# Patient Record
Sex: FEMALE | Race: BLACK OR AFRICAN AMERICAN
[De-identification: names, ages, dates, MRNs, and addresses within clinical notes are randomized per-mention and may not be internally consistent; named-entity substitution may affect disease eponyms.]

---

## 2020-03-17 ENCOUNTER — HOSPITAL ENCOUNTER (EMERGENCY)
Dept: HOSPITAL 72 - EMR | Age: 70
Discharge: HOME | End: 2020-03-17
Payer: MEDICARE

## 2020-03-17 VITALS — DIASTOLIC BLOOD PRESSURE: 102 MMHG | SYSTOLIC BLOOD PRESSURE: 151 MMHG

## 2020-03-17 VITALS — WEIGHT: 165 LBS | HEIGHT: 66 IN | BODY MASS INDEX: 26.52 KG/M2

## 2020-03-17 DIAGNOSIS — R52: ICD-10-CM

## 2020-03-17 DIAGNOSIS — Z88.6: ICD-10-CM

## 2020-03-17 DIAGNOSIS — Z95.0: ICD-10-CM

## 2020-03-17 DIAGNOSIS — R30.0: Primary | ICD-10-CM

## 2020-03-17 DIAGNOSIS — Z86.73: ICD-10-CM

## 2020-03-17 DIAGNOSIS — I10: ICD-10-CM

## 2020-03-17 LAB
APPEARANCE UR: CLEAR
APTT PPP: (no result) S
GLUCOSE UR STRIP-MCNC: NEGATIVE MG/DL
KETONES UR QL STRIP: NEGATIVE
LEUKOCYTE ESTERASE UR QL STRIP: (no result)
NITRITE UR QL STRIP: NEGATIVE
PH UR STRIP: 7 [PH] (ref 4.5–8)
PROT UR QL STRIP: NEGATIVE
SP GR UR STRIP: 1.01 (ref 1–1.03)
UROBILINOGEN UR-MCNC: NORMAL MG/DL (ref 0–1)

## 2020-03-17 PROCEDURE — 99283 EMERGENCY DEPT VISIT LOW MDM: CPT

## 2020-03-17 PROCEDURE — 96372 THER/PROPH/DIAG INJ SC/IM: CPT

## 2020-03-17 PROCEDURE — 81003 URINALYSIS AUTO W/O SCOPE: CPT

## 2020-03-17 NOTE — NUR
ER DISCHARGE NOTE: Patient is cleared to be discharged per ERMD, pt is aox4, on 
room air, with stable vital signs as documented. pt was given dc and 
prescription instructions and was able to verbalize understanding, pt id band 
removed. pt is able to ambulate with steady gait. pt took all belongings.

## 2020-03-17 NOTE — EMERGENCY ROOM REPORT
History of Present Illness


General


Chief Complaint:  Female Urogenital Problems





Present Illness


HPI


69-year-old female presents to the emergency department complaining of 8 out of 

10 severity dysuria x2 weeks in addition to progressive diffuse body aches that 

are primarily in the joints.  Patient reports that she just recently finished a 

course of antibiotics for UTI symptoms.  Patient states she continues to have 

dysuria despite finishing course of antibiotics.  Patient denies fevers or 

chills.  She reports significant past medical history of high blood pressure 

and pacemaker placement.  Patient denies trauma or fall.  She denies any other 

symptoms at this time and does not have any aggravating or relieving factors.  

Patient reports she is only experiencing pain with urination and denies having 

urinary frequency, urgency, abdominal pain/tenderness, genital lesions or 

rashes. She denies itching.


COVID-19 risk:Travel to affect:  No


Allergies:  


Coded Allergies:  


     HYDROMORPHONE (Verified  Adverse Reaction, Mild, ITCHING, 8/9/09)





Patient History


Past Medical History:  see triage record


Past Surgical History:  none


Pertinent Family History:  none


Pregnant Now:  No


Reviewed Nursing Documentation:  PMH: Agreed; PSxH: Agreed





Nursing Documentation-PMH


Past Medical History:  No History, Except For


Hx Cardiac Problems:  Yes


Hx Hypertension:  Yes


Hx Pacemaker:  Yes


Hx Asthma:  No


Hx COPD:  No


Hx Diabetes:  No


Hx Cancer:  No


Hx Gastrointestinal Problems:  No


Hx Dialysis:  No


History Of Psychiatric Problem:  No


Hx Neurological Problems:  No


Hx Cerebrovascular Accident:  Yes


Hx Seizures:  No





Review of Systems


All Other Systems:  negative except mentioned in HPI





Physical Exam





Vital Signs








  Date Time  Temp Pulse Resp B/P (MAP) Pulse Ox O2 Delivery O2 Flow Rate FiO2


 


3/17/20 17:17 98.8 83 16 154/108 (123) 96 Room Air  








Sp02 EP Interpretation:  reviewed, normal


General Appearance:  no apparent distress, alert, GCS 15, non-toxic


Head:  normocephalic, atraumatic


Eyes:  bilateral eye normal inspection, bilateral eye PERRL


ENT:  hearing grossly normal, normal voice


Neck:  full range of motion


Respiratory:  lungs clear, normal breath sounds, speaking full sentences


Cardiovascular #1:  regular rate, rhythm


Gastrointestinal:  normal bowel sounds, non tender, soft, non-distended, no 

guarding


Genitourinary:  normal inspection, no CVA tenderness


Musculoskeletal:  normal range of motion, gait/station normal, non-tender


Neurologic:  alert, motor strength/tone normal, oriented x3, sensory intact, 

responsive, speech normal


Psychiatric:  judgement/insight normal


Skin:  no rash, normal color, normal inspection


Lymphatic:  no adenopathy





Medical Decision Making


PA Attestation


Dr. Morillo is my supervising Physician whom patient management has been 

discussed with.


Diagnostic Impression:  


 Primary Impression:  


 Dysuria


 Additional Impression:  


 Body aches


ER Course


69-year-old female presents to the emergency department complaining of 8 out of 

10 severity dysuria x2 weeks in addition to progressive diffuse body aches that 

are primarily in the joints.  Patient reports that she just recently finished a 

course of antibiotics for UTI symptoms.  Patient states she continues to have 

dysuria despite finishing course of antibiotics.  Patient denies fevers or 

chills.  She reports significant past medical history of high blood pressure 

and pacemaker placement.  Patient denies trauma or fall.  She denies any other 

symptoms at this time and does not have any aggravating or relieving factors.  

Patient reports she is only experiencing pain with urination and denies having 

urinary frequency, urgency, abdominal pain/tenderness, genital lesions or 

rashes. She denies itching. 





Ddx considered but are not limited to UTi , Pyelo, STI, Stone, Cystitis


Vital signs: are WNL, pt. is afebrile 


H&PE are most consistent with UTI 


ORDERS:


- UA labs are attached  --most indicative of contamination: presence of equal 

amounts of  bacteria and  squamous cells, no elevation in inflammatory markers, 

nitrite negative.


ED INTERVENTIONS:  Pyridium PO and  Toradol IM





-I do not identify an emergent condition at this time. With current presentation

,  pt. is stable for close outpatient follow up and conservative treatment.  D/

w pt. to return promptly to ED with worsening or new symptoms.- Pt. verbalizes' 

understanding and agreement with proposed treatment plan.





DISCHARGE: At this time pt. is stable for d/c to home. Will provide printed 

patient care instructions, and any necessary prescriptions. Care plan and 

follow up instructions have been discussed with the patient prior to discharge.





Labs








Test


  3/17/20


17:33


 


Urine Color Pale yellow 


 


Urine Appearance Clear 


 


Urine pH 7 (4.5-8.0) 


 


Urine Specific Gravity


  1.010


(1.005-1.035)


 


Urine Protein


  Negative


(NEGATIVE)


 


Urine Glucose (UA)


  Negative


(NEGATIVE)


 


Urine Ketones


  Negative


(NEGATIVE)


 


Urine Blood


  Negative


(NEGATIVE)


 


Urine Nitrite


  Negative


(NEGATIVE)


 


Urine Bilirubin


  Negative


(NEGATIVE)


 


Urine Urobilinogen


  Normal MG/DL


(0.0-1.0)


 


Urine Leukocyte Esterase 2+ (NEGATIVE) 


 


Urine RBC


  0-2 /HPF (0 -


2)


 


Urine WBC


  2-4 /HPF (0 -


2)


 


Urine Squamous Epithelial


Cells Few /LPF


(NONE/OCC)


 


Urine Bacteria


  Few /HPF


(NONE)











Last Vital Signs








  Date Time  Temp Pulse Resp B/P (MAP) Pulse Ox O2 Delivery O2 Flow Rate FiO2


 


3/17/20 17:32 98.5 82 16 151/102 96 Room Air  








Disposition:  HOME, SELF-CARE


Condition:  Stable


Scripts


Phenazopyridine Hcl* (PYRIDIUM*) 200 Mg Tablet


200 MG ORAL THREE TIMES A DAY for 3 Days, #9 TAB 0 Refills


   Prov: Alayna Cabello         3/17/20 


Naproxen* (NAPROXEN*) 375 Mg Tablet.dr


375 MG ORAL TWICE A DAY for 10 Days, #20 TAB


   Prov: Alayna Cabello         3/17/20


Patient Instructions:  Dysuria, Joint Pain, Muscle Pain, Adult





Additional Instructions:  


Take medications as directed. 





 ** Follow up with a Primary Care Provider in 3-5 days, even if your symptoms 

have resolved. ** 





Return sooner to ED if new symptoms occur, or current symptoms become worse. 











- Please note that this Emergency Department Report was dictated using [employer] technology software, occasionally this can lead to 

erroneous entry secondary to interpretation by the dictation equipment.











Alayna Cabello Mar 17, 2020 18:34

## 2020-03-17 NOTE — NUR
ED Nurse Note:  Pt walked in from home c/o burning with urination x 2 weeks. 
Per pt, she was being treated for UTI with abx and finished them last friday, 
but the pain is back. No SOB noted. Vitals stable.